# Patient Record
Sex: MALE | Race: OTHER | HISPANIC OR LATINO | URBAN - METROPOLITAN AREA
[De-identification: names, ages, dates, MRNs, and addresses within clinical notes are randomized per-mention and may not be internally consistent; named-entity substitution may affect disease eponyms.]

---

## 2020-02-10 ENCOUNTER — EMERGENCY (EMERGENCY)
Facility: HOSPITAL | Age: 50
LOS: 1 days | Discharge: HOME | End: 2020-02-10
Attending: EMERGENCY MEDICINE | Admitting: EMERGENCY MEDICINE
Payer: MEDICAID

## 2020-02-10 VITALS
RESPIRATION RATE: 16 BRPM | DIASTOLIC BLOOD PRESSURE: 80 MMHG | OXYGEN SATURATION: 99 % | TEMPERATURE: 98 F | SYSTOLIC BLOOD PRESSURE: 124 MMHG | HEART RATE: 62 BPM

## 2020-02-10 VITALS
TEMPERATURE: 98 F | HEART RATE: 93 BPM | RESPIRATION RATE: 16 BRPM | WEIGHT: 171.96 LBS | DIASTOLIC BLOOD PRESSURE: 68 MMHG | SYSTOLIC BLOOD PRESSURE: 145 MMHG | HEIGHT: 67 IN | OXYGEN SATURATION: 99 %

## 2020-02-10 LAB
ALBUMIN SERPL ELPH-MCNC: 4.3 G/DL — SIGNIFICANT CHANGE UP (ref 3.3–5)
ALP SERPL-CCNC: 83 U/L — SIGNIFICANT CHANGE UP (ref 40–120)
ALT FLD-CCNC: 32 U/L — SIGNIFICANT CHANGE UP (ref 12–78)
ANION GAP SERPL CALC-SCNC: 11 MMOL/L — SIGNIFICANT CHANGE UP (ref 5–17)
AST SERPL-CCNC: 25 U/L — SIGNIFICANT CHANGE UP (ref 15–37)
BASOPHILS # BLD AUTO: 0.06 K/UL — SIGNIFICANT CHANGE UP (ref 0–0.2)
BASOPHILS NFR BLD AUTO: 0.6 % — SIGNIFICANT CHANGE UP (ref 0–2)
BILIRUB SERPL-MCNC: 0.6 MG/DL — SIGNIFICANT CHANGE UP (ref 0.2–1.2)
BUN SERPL-MCNC: 15 MG/DL — SIGNIFICANT CHANGE UP (ref 7–23)
CALCIUM SERPL-MCNC: 8.9 MG/DL — SIGNIFICANT CHANGE UP (ref 8.5–10.1)
CHLORIDE SERPL-SCNC: 106 MMOL/L — SIGNIFICANT CHANGE UP (ref 96–108)
CO2 SERPL-SCNC: 24 MMOL/L — SIGNIFICANT CHANGE UP (ref 22–31)
CREAT SERPL-MCNC: 1 MG/DL — SIGNIFICANT CHANGE UP (ref 0.5–1.3)
EOSINOPHIL # BLD AUTO: 0.06 K/UL — SIGNIFICANT CHANGE UP (ref 0–0.5)
EOSINOPHIL NFR BLD AUTO: 0.6 % — SIGNIFICANT CHANGE UP (ref 0–6)
GLUCOSE SERPL-MCNC: 110 MG/DL — HIGH (ref 70–99)
HCT VFR BLD CALC: 42.4 % — SIGNIFICANT CHANGE UP (ref 39–50)
HGB BLD-MCNC: 14.5 G/DL — SIGNIFICANT CHANGE UP (ref 13–17)
IMM GRANULOCYTES NFR BLD AUTO: 0.2 % — SIGNIFICANT CHANGE UP (ref 0–1.5)
LYMPHOCYTES # BLD AUTO: 1.88 K/UL — SIGNIFICANT CHANGE UP (ref 1–3.3)
LYMPHOCYTES # BLD AUTO: 20.1 % — SIGNIFICANT CHANGE UP (ref 13–44)
MCHC RBC-ENTMCNC: 31.1 PG — SIGNIFICANT CHANGE UP (ref 27–34)
MCHC RBC-ENTMCNC: 34.2 GM/DL — SIGNIFICANT CHANGE UP (ref 32–36)
MCV RBC AUTO: 91 FL — SIGNIFICANT CHANGE UP (ref 80–100)
MONOCYTES # BLD AUTO: 0.88 K/UL — SIGNIFICANT CHANGE UP (ref 0–0.9)
MONOCYTES NFR BLD AUTO: 9.4 % — SIGNIFICANT CHANGE UP (ref 2–14)
NEUTROPHILS # BLD AUTO: 6.47 K/UL — SIGNIFICANT CHANGE UP (ref 1.8–7.4)
NEUTROPHILS NFR BLD AUTO: 69.1 % — SIGNIFICANT CHANGE UP (ref 43–77)
NRBC # BLD: 0 /100 WBCS — SIGNIFICANT CHANGE UP (ref 0–0)
PLATELET # BLD AUTO: 170 K/UL — SIGNIFICANT CHANGE UP (ref 150–400)
POTASSIUM SERPL-MCNC: 3.1 MMOL/L — LOW (ref 3.5–5.3)
POTASSIUM SERPL-SCNC: 3.1 MMOL/L — LOW (ref 3.5–5.3)
PROT SERPL-MCNC: 7.7 G/DL — SIGNIFICANT CHANGE UP (ref 6–8.3)
RBC # BLD: 4.66 M/UL — SIGNIFICANT CHANGE UP (ref 4.2–5.8)
RBC # FLD: 13.1 % — SIGNIFICANT CHANGE UP (ref 10.3–14.5)
SODIUM SERPL-SCNC: 141 MMOL/L — SIGNIFICANT CHANGE UP (ref 135–145)
TROPONIN I SERPL-MCNC: <.015 NG/ML — SIGNIFICANT CHANGE UP (ref 0.01–0.04)
WBC # BLD: 9.37 K/UL — SIGNIFICANT CHANGE UP (ref 3.8–10.5)
WBC # FLD AUTO: 9.37 K/UL — SIGNIFICANT CHANGE UP (ref 3.8–10.5)

## 2020-02-10 PROCEDURE — 93010 ELECTROCARDIOGRAM REPORT: CPT

## 2020-02-10 PROCEDURE — 80053 COMPREHEN METABOLIC PANEL: CPT

## 2020-02-10 PROCEDURE — 84484 ASSAY OF TROPONIN QUANT: CPT

## 2020-02-10 PROCEDURE — 71046 X-RAY EXAM CHEST 2 VIEWS: CPT

## 2020-02-10 PROCEDURE — 85027 COMPLETE CBC AUTOMATED: CPT

## 2020-02-10 PROCEDURE — 71046 X-RAY EXAM CHEST 2 VIEWS: CPT | Mod: 26

## 2020-02-10 PROCEDURE — 36415 COLL VENOUS BLD VENIPUNCTURE: CPT

## 2020-02-10 PROCEDURE — 99285 EMERGENCY DEPT VISIT HI MDM: CPT

## 2020-02-10 PROCEDURE — 93005 ELECTROCARDIOGRAM TRACING: CPT

## 2020-02-10 PROCEDURE — 99284 EMERGENCY DEPT VISIT MOD MDM: CPT | Mod: 25

## 2020-02-10 RX ORDER — ALPRAZOLAM 0.25 MG
0.25 TABLET ORAL ONCE
Refills: 0 | Status: DISCONTINUED | OUTPATIENT
Start: 2020-02-10 | End: 2020-02-10

## 2020-02-10 RX ORDER — BISOPROLOL FUMARATE 10 MG/1
1 TABLET, FILM COATED ORAL
Qty: 0 | Refills: 0 | DISCHARGE

## 2020-02-10 RX ORDER — POTASSIUM CHLORIDE 20 MEQ
40 PACKET (EA) ORAL ONCE
Refills: 0 | Status: COMPLETED | OUTPATIENT
Start: 2020-02-10 | End: 2020-02-10

## 2020-02-10 RX ORDER — ASPIRIN/CALCIUM CARB/MAGNESIUM 324 MG
1 TABLET ORAL
Qty: 0 | Refills: 0 | DISCHARGE

## 2020-02-10 RX ADMIN — Medication 0.25 MILLIGRAM(S): at 07:26

## 2020-02-10 RX ADMIN — Medication 40 MILLIEQUIVALENT(S): at 08:25

## 2020-02-10 NOTE — ED ADULT TRIAGE NOTE - CHIEF COMPLAINT QUOTE
c/o feeling heart racing * half hour- feels pressure on the left side of the ribs- has not been feeling well for the past couple of days, has cough, cold, congestion

## 2020-02-10 NOTE — ED ADULT NURSE NOTE - OBJECTIVE STATEMENT
pt states he had sinus pain and pressure for the past few days - today felt palpitations and panic- pt with hx of panic attacks - feels like his heart is racing

## 2020-02-10 NOTE — ED POST DISCHARGE NOTE - RESULT SUMMARY
#[id number] discussed xray chest result with patient over phone, understands to f/u with PMD for more outpatient imaging, will  report later this afternoon

## 2020-02-10 NOTE — ED PROVIDER NOTE - OBJECTIVE STATEMENT
translation by Manisha tech  49 yo male hx of htn c/o palpitations and anxiety since yesterday, nonradiating, no medications taken for this. recent URI symptoms and sinus pain taking mucinex.  No fever/chills.  No body aches.  No n/v/d.  No abdominal pain. No chest pain, no shortness of breath  Feels like his previous panic attack. Pt requesting anti anxiety medication

## 2020-02-10 NOTE — ED PROVIDER NOTE - PATIENT PORTAL LINK FT
You can access the FollowMyHealth Patient Portal offered by [facility] by registering at the following website: [email or web address]. By joining [employer]’s FollowMyHealth portal, you will also be able to view your health information using other applications (apps) compatible with our system.

## 2020-02-10 NOTE — ED ADULT NURSE NOTE - ED STAT RN HANDOFF DETAILS
Received the patient in the ER at the time of change of shift. Patient is alert and oriented. Skin warm and dry. Resting comfortably. Denies any chest pain. Pending further evaluation.

## 2020-02-10 NOTE — ED PROVIDER NOTE - NSFOLLOWUPINSTRUCTIONS_ED_ALL_ED_FT
1) Follow-up with your Primary Medical Doctor or referred doctor. Call today / next business day for prompt follow-up.  2) Return to Emergency room for any worsening or persistent pain, weakness, fever, or any other concerning symptoms.  3) See attached instruction sheets for additional information, including information regarding signs and symptoms to look out for, reasons to seek immediate care and other important instructions.  4) Take augmentin 875mg every 12 hours for 10 days.

## 2020-02-10 NOTE — ED PROVIDER NOTE - CARE PLAN
Principal Discharge DX:	Palpitations  Secondary Diagnosis:	Panic attack Principal Discharge DX:	Palpitations  Secondary Diagnosis:	Panic attack  Secondary Diagnosis:	Hypokalemia  Secondary Diagnosis:	Acute maxillary sinusitis, recurrence not specified

## 2020-02-10 NOTE — ED PROVIDER NOTE - PHYSICAL EXAMINATION
Gen: Alert, NAD  Head/eyes: NC/AT, PERRL  ENT: Bilateral TM WNL, normal hearing, patent oropharynx without erythema/exudate, uvula midline, no peritonsillar abscess, no tongue/uvula swelling, +ttp right maxillary sinus  Neck: supple, no tenderness/meningismus/JVD, Trachea midline  Pulm/lung: Bilateral clear BS, normal resp effort, no wheeze/stridor/retractions  CV/heart: RRR, no M/R/G, +2 dist pulses (radial, pedal DP/PT, popliteal)  GI/Abd: soft, NT/ND, +BS, no guarding/rebound tenderness  Musculoskeletal: no edema/erythema/cyanosis, FROM in all extremities, no C/T/L spine ttp  Skin: no rash, no vesicles, no petechaie, no ecchymosis, no swelling  Neuro: AAOx3, CN 2-12 intact, normal sensation, 5/5 motor strength in all extremities, normal gait, no dysmetria

## 2020-02-10 NOTE — ED PROVIDER NOTE - NS ED ROS FT

## 2020-06-27 PROBLEM — I10 ESSENTIAL (PRIMARY) HYPERTENSION: Chronic | Status: ACTIVE | Noted: 2020-02-10

## 2020-07-10 PROBLEM — Z00.00 ENCOUNTER FOR PREVENTIVE HEALTH EXAMINATION: Status: ACTIVE | Noted: 2020-07-10

## 2020-07-15 ENCOUNTER — APPOINTMENT (OUTPATIENT)
Dept: PULMONOLOGY | Facility: CLINIC | Age: 50
End: 2020-07-15

## 2020-09-01 ENCOUNTER — APPOINTMENT (OUTPATIENT)
Dept: PULMONOLOGY | Facility: CLINIC | Age: 50
End: 2020-09-01
Payer: MEDICAID

## 2020-09-01 VITALS
HEIGHT: 64 IN | DIASTOLIC BLOOD PRESSURE: 82 MMHG | BODY MASS INDEX: 22.88 KG/M2 | HEART RATE: 72 BPM | SYSTOLIC BLOOD PRESSURE: 138 MMHG | RESPIRATION RATE: 14 BRPM | OXYGEN SATURATION: 98 % | WEIGHT: 134 LBS

## 2020-09-01 DIAGNOSIS — Z78.9 OTHER SPECIFIED HEALTH STATUS: ICD-10-CM

## 2020-09-01 DIAGNOSIS — Z87.891 PERSONAL HISTORY OF NICOTINE DEPENDENCE: ICD-10-CM

## 2020-09-01 DIAGNOSIS — Z87.19 PERSONAL HISTORY OF OTHER DISEASES OF THE DIGESTIVE SYSTEM: ICD-10-CM

## 2020-09-01 DIAGNOSIS — Z86.39 PERSONAL HISTORY OF OTHER ENDOCRINE, NUTRITIONAL AND METABOLIC DISEASE: ICD-10-CM

## 2020-09-01 DIAGNOSIS — R09.89 OTHER SPECIFIED SYMPTOMS AND SIGNS INVOLVING THE CIRCULATORY AND RESPIRATORY SYSTEMS: ICD-10-CM

## 2020-09-01 DIAGNOSIS — Z86.79 PERSONAL HISTORY OF OTHER DISEASES OF THE CIRCULATORY SYSTEM: ICD-10-CM

## 2020-09-01 DIAGNOSIS — R91.1 SOLITARY PULMONARY NODULE: ICD-10-CM

## 2020-09-01 DIAGNOSIS — J98.11 ATELECTASIS: ICD-10-CM

## 2020-09-01 DIAGNOSIS — Z82.49 FAMILY HISTORY OF ISCHEMIC HEART DISEASE AND OTHER DISEASES OF THE CIRCULATORY SYSTEM: ICD-10-CM

## 2020-09-01 PROCEDURE — 99204 OFFICE O/P NEW MOD 45 MIN: CPT

## 2020-09-01 RX ORDER — MONTELUKAST 10 MG/1
10 TABLET, FILM COATED ORAL
Refills: 0 | Status: ACTIVE | COMMUNITY

## 2020-09-01 RX ORDER — BISOPROLOL FUMARATE AND HYDROCHLOROTHIAZIDE 2.5; 6.25 MG/1; MG/1
2.5-6.25 TABLET, FILM COATED ORAL
Refills: 0 | Status: ACTIVE | COMMUNITY

## 2020-09-01 RX ORDER — ASPIRIN 81 MG
81 TABLET, DELAYED RELEASE (ENTERIC COATED) ORAL
Refills: 0 | Status: ACTIVE | COMMUNITY

## 2020-09-01 RX ORDER — ATORVASTATIN CALCIUM 20 MG/1
20 TABLET, FILM COATED ORAL
Refills: 0 | Status: ACTIVE | COMMUNITY

## 2020-09-01 NOTE — DISCUSSION/SUMMARY
[FreeTextEntry1] : \par #1. Will schedule PFTs in near future to assess lung function with Covid testing prior to PFTs\par #2. The patient does not appear to require chronic BD therapy at this time\par #3. Diet and exercise for weight loss\par #4. SOBOE is likely related to weight or deconditioning\par #5. Repeat chest CT in 3/2021 for one year f/u and with PFTs given prior smoking hx\par #6. Quantiferon Gold for possible prior TB exposure\par #7. F/u in 3/2021 with chest CT and PFTs

## 2020-09-01 NOTE — PHYSICAL EXAM
[No Acute Distress] : no acute distress [Well Nourished] : well nourished [Well Developed] : well developed [Normal Appearance] : normal appearance [Supple] : supple [No Resp Distress] : no resp distress [No Acc Muscle Use] : no acc muscle use [Normal Rhythm and Effort] : normal rhythm and effort [Clear to Auscultation Bilaterally] : clear to auscultation bilaterally [No Abnormalities] : no abnormalities [Benign] : benign [Not Tender] : not tender [Soft] : soft [Normal Gait] : normal gait [No Clubbing] : no clubbing [No Cyanosis] : no cyanosis [No Edema] : no edema [No Focal Deficits] : no focal deficits [Oriented x3] : oriented x3

## 2020-09-01 NOTE — REVIEW OF SYSTEMS
[Sinus Problems] : sinus problems [Headache] : headache [Fever] : no fever [Chills] : no chills [Dry Eyes] : no dry eyes [Epistaxis] : no epistaxis [Eye Irritation] : no eye irritation [Nasal Congestion] : no nasal congestion [Postnasal Drip] : no postnasal drip [Cough] : no cough [Hemoptysis] : no hemoptysis [Chest Tightness] : no chest tightness [Frequent URIs] : no frequent URIs [Sputum] : no sputum [Dyspnea] : no dyspnea [Chest Discomfort] : no chest discomfort [Edema] : no edema [Palpitations] : no palpitations [Syncope] : no syncope [Hay Fever] : no hay fever [Itchy Eyes] : no itchy eyes [Seasonal Allergies] : no seasonal allergies [GERD] : no gerd [Abdominal Pain] : no abdominal pain [Nausea] : no nausea [Vomiting] : no vomiting [Diarrhea] : no diarrhea [Constipation] : no constipation [Back Pain] : no back pain [Anemia] : no anemia [Seizures] : no seizures [Dizziness] : no dizziness [Numbness] : no numbness [Paralysis] : no paralysis [Confusion] : no confusion [Depression] : no depression [Anxiety] : no anxiety [Diabetes] : no diabetes [Thyroid Problem] : no thyroid problem

## 2020-09-01 NOTE — HISTORY OF PRESENT ILLNESS
[Former] : former [Never] : never [TextBox_4] : Pt with an incidental finding of calcified granuloma on prior chest CT so presents for evaluation.\par He exercises regularly and has no respiratory complaints.\par He is an ex-smoker of 1/2 ppd x 10 years, quit in 2003.\par Pt reports a prior h/o bronchitis and pneumonia in 2018.\par Pt is from [facility] and denies any prior TB exposure. [TextBox_11] : .5 [TextBox_13] : 10 [YearQuit] : 2003

## 2020-09-01 NOTE — CONSULT LETTER
[Dear  ___] : Dear  [unfilled], [Consult Letter:] : I had the pleasure of evaluating your patient, [unfilled]. [Please see my note below.] : Please see my note below. [Consult Closing:] : Thank you very much for allowing me to participate in the care of this patient.  If you have any questions, please do not hesitate to contact me. [Sincerely,] : Sincerely, [FreeTextEntry3] : Tony Richter MD, FCCP, D. ABSM\par Pulmonary and Sleep Medicine\par [facility] [place]

## 2020-09-01 NOTE — REASON FOR VISIT
[Initial] : an initial visit [Abnormal CXR/ Chest CT] : an abnormal CXR/ chest CT [Pulmonary Nodules] : pulmonary nodules [[employer] ] : provided by [employer]   [FreeTextEntry1] : [id number] [FreeTextEntry2] : [place] [TWNoteComboBox1] : [place]

## 2021-04-26 ENCOUNTER — APPOINTMENT (OUTPATIENT)
Dept: PULMONOLOGY | Facility: CLINIC | Age: 51
End: 2021-04-26

## 2022-08-23 ENCOUNTER — APPOINTMENT (OUTPATIENT)
Dept: ORTHOPEDIC SURGERY | Facility: CLINIC | Age: 52
End: 2022-08-23

## 2022-08-23 VITALS
BODY MASS INDEX: 23.05 KG/M2 | WEIGHT: 135 LBS | SYSTOLIC BLOOD PRESSURE: 144 MMHG | HEIGHT: 64 IN | DIASTOLIC BLOOD PRESSURE: 80 MMHG | HEART RATE: 70 BPM

## 2022-08-23 PROCEDURE — 99204 OFFICE O/P NEW MOD 45 MIN: CPT

## 2022-08-23 PROCEDURE — 72040 X-RAY EXAM NECK SPINE 2-3 VW: CPT

## 2022-08-23 NOTE — HISTORY OF PRESENT ILLNESS
[de-identified] : 52-year-old male is complaining mainly of left focal shoulder pain onraising his shoulder. He also has right focal shoulder pain which has improved since he had physical therapy for the last 3 months since May 1, 2022once to twice a week specifically for neck and Right shoulder pain.  Shoulder pain began approximately one month ago after he was painting with a roller for significant amount of time several days in a row. He also has right-sided neck pain muscle spasm in nature, worseon palpation and with rotation to the left. He has had no physical therapy for his left shoulder pain. He has had no pain management intervention. Pain radiates at worst 8/10, he states it is incapacitating and he is unable to accomplish his activities of daily living when it is exacerbated. Neck pain radiates into his head causing headaches. YAZMIN questionnaire is negative he is not falling does not have decreased dexterity or sloppy penmanship.  He complains of polyarthralgias pain of multiple joints including the knees\par Past medical social allergy and family history was reviewed. Significant history includes a past diagnosis of fibromyalgia for which she was treated approximately 8 years ago by a rheumatologist but is now under no medical treatment for this.

## 2022-08-23 NOTE — PHYSICAL EXAM
[de-identified] : \par Cervical exam:\par CONSTITUTIONAL:  Patient is a very pleasant individual who is well-nourished and appears stated age. \par PSYCHIATRIC:  Alert and oriented times three and in no apparent distress, and participates with orthopedic evaluation well.\par HEAD:  Atraumatic and  nonsyndromic in appearance.\par EENT: No thyromegaly, EOMI.\par RESPIRATORY:  Respiratory rate is regular, not dyspneic on examination.\par LYMPHATICS:  There is no cervical or axillary lymphadenopathy.\par INTEGUMENTARY:  Skin is clean, dry, and intact about the bilateral upper extremities and cervical spine. \par VASCULAR:   There is brisk capillary refill about the bilateral upper extremities and radial pulses are 2/4. \par NEUROLOGIC:  Negative L'hirmitte, negative Spurling's sign. There are no pathologic reflexes. There is no decrease in sensation of the bilateral upper extremities on Wartenberg pinwheel/manual examination.  Deep tendon reflexes are well-maintained at +2/4 of the bilateral upper extremities and are symmetric.\par MUSCULOSKELETAL:  There is no visible muscular atrophy.  Manual motor strength is well maintained in the bilateral upper extremities.  Cervical range of motion is well maintained Pain on cervical extension and rotation to the left, reproducible myositis of the sternocleidomastoid deltoid trapezius on the right..  The patient ambulates in a non-myelopathic manner. Normal secondary orthopaedic exam of bilateral elbows and hands.  Elbow flexion and extension, wrist extension, finger flexion and abduction are well maintained.\par Positive left focal shoulder findings including left greater than right subacromial tenderness, pain on abduction and flexion of the left shoulder greater than the degrees, positive Fisher sign on the left.\par  [de-identified] : X-ray done today of the cervical spine shows decreased in intervertebral disc space at C5-C6 as well as decreased cervical lordosis. Otherwise there is no acute finding.

## 2022-08-23 NOTE — DISCUSSION/SUMMARY
[Medication Risks Reviewed] : Medication risks reviewed [de-identified] : Conservative treatment was discussed with the patient at length. Anticipatory guidance regarding disease process Fibromyalgia, polyarthralgias cervical spondylosis and myositis left shoulder bursitis, avoidance of acute exacerbation this was discussed at length and all patients commenting concerns were answered to the patient's satisfaction. Physical therapy for decrease pain and increase function was ordered. Patient was given home exercises as approved by [employer] and works well held directed toward this particular process. Intermittent use of acetaminophen 500 mg 2 tablets t.i.d. p.r.n. mild to moderate pain, ibuprofen 600 mg t.i.d. p.r.n. severe pain with food or milk if there is no medical contraindication. Medrol Dosepak for anti-inflammatory properties was described. Patient was informed that we did not give chronic prescriptions for office as we are surgical practice and did not have the activity to follow blood work and side effect profile etc.Home exercise including stretching on a daily basis for 20-30 minutes was recommended. Heat, ice, topical were discussed as needed. The patient will followup in 6-8 weeks at which point in time if symptoms continue we will order MRI studies to guide treatment plan including possible injection therapy with pain management versus surgical option.\par A focal shoulder pain continues consider a referral to upper extremities specialty for definitive management. Regarding fibromyalgiaandpolyarthralgias patient is referred to a rheumatologist.

## 2022-09-01 ENCOUNTER — APPOINTMENT (OUTPATIENT)
Dept: MRI IMAGING | Facility: CLINIC | Age: 52
End: 2022-09-01

## 2022-09-01 ENCOUNTER — OUTPATIENT (OUTPATIENT)
Dept: OUTPATIENT SERVICES | Facility: HOSPITAL | Age: 52
LOS: 1 days | End: 2022-09-01

## 2022-09-01 DIAGNOSIS — M47.812 SPONDYLOSIS WITHOUT MYELOPATHY OR RADICULOPATHY, CERVICAL REGION: ICD-10-CM

## 2022-09-01 PROCEDURE — 72141 MRI NECK SPINE W/O DYE: CPT | Mod: 26

## 2022-10-04 ENCOUNTER — APPOINTMENT (OUTPATIENT)
Dept: ORTHOPEDIC SURGERY | Facility: CLINIC | Age: 52
End: 2022-10-04

## 2022-10-04 VITALS
HEIGHT: 64 IN | DIASTOLIC BLOOD PRESSURE: 80 MMHG | SYSTOLIC BLOOD PRESSURE: 129 MMHG | BODY MASS INDEX: 23.05 KG/M2 | WEIGHT: 135 LBS | HEART RATE: 76 BPM

## 2022-10-04 PROCEDURE — 99213 OFFICE O/P EST LOW 20 MIN: CPT

## 2022-10-04 NOTE — PHYSICAL EXAM
[de-identified] : CONSTITUTIONAL: Patient is a very pleasant individual who is well-nourished and appears stated age. \par PSYCHIATRIC: Alert and oriented times three and in no apparent distress, and participates with orthopedic evaluation well.\par HEAD: Atraumatic and nonsyndromic in appearance.\par EENT: No thyromegaly, EOMI.\par RESPIRATORY: Respiratory rate is regular, not dyspneic on examination.\par LYMPHATICS: There is no cervical or axillary lymphadenopathy.\par INTEGUMENTARY: Skin is clean, dry, and intact about the bilateral upper extremities and cervical spine. \par VASCULAR: There is brisk capillary refill about the bilateral upper extremities and radial pulses are 2/4. \par NEUROLOGIC: Negative L'hirmitte, negative Spurling's sign. There are no pathologic reflexes. There is no decrease in sensation of the bilateral upper extremities on Wartenberg pinwheel/manual examination. Deep tendon reflexes are well-maintained at +2/4 of the bilateral upper extremities and are symmetric.\par MUSCULOSKELETAL: There is no visible muscular atrophy. Manual motor strength is well maintained in the bilateral upper extremities. Cervical range of motion is well maintained Pain on cervical extension and rotation to the left, reproducible myositis of the sternocleidomastoid deltoid trapezius on the right.. The patient ambulates in a non-myelopathic manner. Normal secondary orthopaedic exam of bilateral elbows and hands. Elbow flexion and extension, wrist extension, finger flexion and abduction are well maintained.\par Positive left focal shoulder findings including left greater than right subacromial tenderness, pain on abduction and flexion of the left shoulder greater than the degrees, positive Fisher sign on the left. [de-identified] : No new imaging\par \par X-ray done today of the cervical spine shows decreased in intervertebral disc space at C5-C6 as well as decreased cervical lordosis. Otherwise there is no acute finding.

## 2022-10-04 NOTE — DISCUSSION/SUMMARY
[Medication Risks Reviewed] : Medication risks reviewed [de-identified] : He is referred to the medicine and rehabilitation for facet injections particularly at C3-C4 on the right which may diminish his pain. He should be considered for trigger point injections as well. I am giving him Medrol Dosepak for anti-inflammatory properties after which he can restart diclofenac as prescribed by his primary care provider. Tylenol 1000 mg p.o. t.i.d. can also be helpful in decreasing pain.  Is no operative indication. He will followup p.r.n. continued pain despite one or 2 pain management injections.\par He is referred to rheumatology for evaluation for possible fibromyalgia or rheumatologic illness that can be treated with indication.

## 2022-10-04 NOTE — HISTORY OF PRESENT ILLNESS
[de-identified] : 52-year-old male continues to complain of right-sided upper neck pain radiating tohis head causing migraine headaches and radiating downward between his shoulder blades. Pain is severe at times,it has been going on approximately 2-1/2 months and has been constant. It's worse when he sits or stands in one position for a long period of time and somewhat better with rest. He is taking diclofenacand has been a role in physical therapy for 2 months or neck and upper extremity pain but hisneck pain continues despite treatment plan. YAZMIN questionnaire is negative. He does have back pain intermittently as well. He also complains of pain in multiple joints and states he has a history of fibromyalgia but has not been treated.

## 2022-10-11 ENCOUNTER — APPOINTMENT (OUTPATIENT)
Dept: ORTHOPEDIC SURGERY | Facility: CLINIC | Age: 52
End: 2022-10-11

## 2022-10-11 VITALS
HEIGHT: 64 IN | BODY MASS INDEX: 23.05 KG/M2 | HEART RATE: 62 BPM | WEIGHT: 135 LBS | DIASTOLIC BLOOD PRESSURE: 84 MMHG | SYSTOLIC BLOOD PRESSURE: 154 MMHG

## 2022-10-11 DIAGNOSIS — M75.52 BURSITIS OF LEFT SHOULDER: ICD-10-CM

## 2022-10-11 PROCEDURE — 99213 OFFICE O/P EST LOW 20 MIN: CPT

## 2022-10-11 NOTE — DISCUSSION/SUMMARY
[Medication Risks Reviewed] : Medication risks reviewed [de-identified] : Patient is again referred to physiatry, Dr. Joshua Atkinson for an injection C3-C4 on the right.  I think that will significantly help to decrease his pain level sufficiently.  He did well with a Medrol Dosepak.  He will continue home exercises as described by [employer].  Tylenol intermittently 500 mg 2 tablets every 8 hours as needed pain.  Heat, ice, topicals as needed.  Avoid excessive cervical extension.  Anticipatory guidance was given regarding disease process, facet stress reaction, how to avoid acute exacerbations and to treat exacerbations.  Follow-up as needed return of pain

## 2022-10-11 NOTE — PHYSICAL EXAM
[de-identified] : CONSTITUTIONAL:  Patient is a very pleasant individual who is well-nourished and appears stated age. \par PSYCHIATRIC:  Alert and oriented times three and in no apparent distress, and participates with orthopedic evaluation well.\par HEAD:  Atraumatic and  nonsyndromic in appearance.\par EENT: No thyromegaly, EOMI.\par RESPIRATORY:  Respiratory rate is regular, not dyspneic on examination.\par LYMPHATICS:  There is no cervical or axillary lymphadenopathy.\par INTEGUMENTARY:  Skin is clean, dry, and intact about the bilateral upper extremities and cervical spine. \par VASCULAR:   There is brisk capillary refill about the bilateral upper extremities and radial pulses are 2/4. \par NEUROLOGIC:  Negative L'hirmitte, negative Spurling's sign. There are no pathologic reflexes. There is no decrease in sensation of the bilateral upper extremities on Wartenberg pinwheel/manual examination.  Deep tendon reflexes are well-maintained at +2/4 of the bilateral upper extremities and are symmetric.\par MUSCULOSKELETAL:  There is no visible muscular atrophy.  Manual motor strength is well maintained in the bilateral upper extremities.  Cervical range of motion is well maintained.  The patient ambulates in a non-myelopathic manner. Normal secondary orthopaedic exam of bilateral shoulders, elbows and hands.  Elbow flexion and extension, wrist extension, finger flexion and abduction are well maintained.\par Exam is highlighted by mechanical neck pain on extension and palpation of the upper right cervical paraspinals. [de-identified] : No new x-rays are reviewed\par \par X-ray done today of the cervical spine shows decreased in intervertebral disc space at C5-C6 as well as decreased cervical lordosis. Otherwise there is no acute finding. \par Mri cervical spine [facility] demonstrates a stress reaction at C3-C4 on the right.  Otherwise cervical MRI is unremarkable.  There is no cord signal change.

## 2022-10-11 NOTE — HISTORY OF PRESENT ILLNESS
[de-identified] : 52-year-old male is here for follow-up of right-sided neck pain.  Pain began after he was painting using an extended neck position approximately 2 and half months ago.  He was given a Medrol Dosepak 10 days ago and states that his pain is significantly decreased.  He would like his pain management injections to be done in the near future.  His pain is based at the upper right side of his neck but it does intermittently radiate downward and upward.  He states for the last 10 days his pain has been rating approximately 4 out of 10, sore in nature.  YAZMIN questionnaire is negative

## 2022-11-02 ENCOUNTER — APPOINTMENT (OUTPATIENT)
Dept: PHYSICAL MEDICINE AND REHAB | Facility: CLINIC | Age: 52
End: 2022-11-02

## 2022-11-02 VITALS
HEIGHT: 64 IN | RESPIRATION RATE: 14 BRPM | BODY MASS INDEX: 27.31 KG/M2 | HEART RATE: 64 BPM | DIASTOLIC BLOOD PRESSURE: 84 MMHG | SYSTOLIC BLOOD PRESSURE: 120 MMHG | WEIGHT: 160 LBS

## 2022-11-02 DIAGNOSIS — Z78.9 OTHER SPECIFIED HEALTH STATUS: ICD-10-CM

## 2022-11-02 PROCEDURE — 99204 OFFICE O/P NEW MOD 45 MIN: CPT

## 2022-11-02 RX ORDER — METHYLPREDNISOLONE 4 MG/1
4 TABLET ORAL
Qty: 1 | Refills: 1 | Status: COMPLETED | COMMUNITY
Start: 2022-10-04 | End: 2022-11-02

## 2022-11-02 RX ORDER — METHYLPREDNISOLONE 4 MG/1
4 TABLET ORAL
Qty: 1 | Refills: 0 | Status: COMPLETED | COMMUNITY
Start: 2022-10-04 | End: 2022-11-02

## 2022-11-02 RX ORDER — METHYLPREDNISOLONE 4 MG/1
4 TABLET ORAL
Qty: 1 | Refills: 0 | Status: COMPLETED | COMMUNITY
Start: 2022-08-23 | End: 2022-11-02

## 2022-11-02 NOTE — DATA REVIEWED
[FreeTextEntry1] : MR C Spine 9/1/22 reviewed by me: marked spondylosis at the R C3-4 facet joint, no areas of severe canal stenosis\par \par   MR Cervical Spine No Cont             Final\par \par No Documents Attached\par \par \par \par \par   EXAM: [id number] - MR SPINE CERVICAL  - ORDERED BY: RAUL FORD\par \par \par PROCEDURE DATE:  09/01/2022\par \par \par \par INTERPRETATION:  CERVICAL SPINE MRI\par \par CLINICAL INFORMATION: Neck pain. Cervical spondylosis.\par TECHNIQUE: Multiplanar, multisequence MRI was obtained of the cervical spine.\par \par FINDINGS:\par \par DISC LEVEL EVALUATION: Minimal multilevel upper cervical uncovertebral spurring without significant central canal or foraminal narrowing.\par FACET JOINTS: Moderate right-sided facet arthrosis at C3-C4 with marked associated bone marrow edema\par ALIGNMENT: Normal\par CORD: There is no cord signal abnormality.\par MARROW: Marked bone marrow edema within the bones of the right-sided facet joint at C3-C4.\par IMAGED BRAIN: Normal\par PERIPHERAL/NECK SOFT TISSUES: Normal\par \par IMPRESSION: Moderate right facet arthrosis at C3-C4 with marked associated bone marrow edema. This finding frequently correlates with facet generated neck pain.\par \par --- End of Report ---\par \par \par \par \par \par \par MARIAN NEWBY MD; Attending Radiologist\par This document has been electronically signed. Sep 13 2022  8:36AM\par \par  \par \par  Ordered by: RAUL FORD       Collected/Examined: 01Sep2022 11:20AM       \par Verified by: RAUL FORD 13Sep2022 12:49PM       \par  Result Communication: No patient communication needed at this time;\par Stage: Final       \par  Performed at: [facility]       Resulted: 12Sep2022 10:57AM       Last Updated: 13Sep2022 12:49PM       Accession: [id number]       
no

## 2022-11-02 NOTE — PROCEDURE
[de-identified] : Reason for procedure: Myalgia\par \par Procedure: Trigger Point Injections\par Physician: Dr. Atkinson\par Medication injected: Lidocaine 1%, 3cc total\par Sedation medications: None\par Estimated blood loss: None\par Complications: None\par \par Technique: R/B/A to trigger point injection reviewed with patient. The patient is agreeable and wishes to proceed.   The patient was placed in the seated position and trigger points of the right cervical paraspinals were identified. The area was prepped in normal sterile fashion with Chloroprep. A 27 gauge 1.25 inch needle was advanced into the palpable trigger points with reproduction of pain. After negative aspiration of heme, the above medications were injected into the trigger areas. Needle was then removed, bandaid placed over injection sites. There was no complications, the patient was provided with post injection instructions.

## 2022-11-02 NOTE — PHYSICAL EXAM
[FreeTextEntry1] : PE:\par Constitutional: In NAD, calm and cooperative\par MSK (Neck):\par 	Inspection: no gross swelling identified\par 	Palpation: Tenderness of the right lower cervical paraspinals\par 	ROM: Pain at end cervical extension > flexion and end lateral rotation to left\par 	Strength: 5/5 strength in bilateral upper extremities\par 	Reflexes: 2+ Biceps/Brachioradialis/Triceps/patella/Achilles reflex bilaterally, Benites’s/Clonus negative bilaterally\par 	Sensation: Intact to light touch in bilateral upper extremities\par 	Special tests: Spurling’s test negative bilaterally

## 2022-11-02 NOTE — ASSESSMENT
[FreeTextEntry1] : Mr. KIMO ELLIS is a 52 year old male who presents with R sided neck pain with C3-4 Facet joint arthrosis seen on MRI that correlates with his pain. Pain likely also has a myofascial component. Denies any red flag signs. Will recommend:\par - MRI C Spine reviewed. Ortho Spine notes reviewed - no surgery recommended at this time. \par - Patient says his pain has improved since his visit with Ortho Spine. Discussed with patient the risks (including but not limited to bleeding, infection, nerve damage, etc), benefits and alternatives to a a medial branch block injection for which patient understands. He would like to hold off on procedure for now\par - TPI performed today, tolerated well\par - Patient to try to avoid NSAIDs given history of HTN. He will continue Tylenol PRN, rarely takes it now. \par \par RTC in 2 weeks. Patient aware of red flag signs including any changes to their bowel/bladder control, groin numbness or new weakness. Patient knows to seek immediate attention by calling 911 or going to nearest ER if these symptoms appear.

## 2022-11-02 NOTE — HISTORY OF PRESENT ILLNESS
[FreeTextEntry1] : Mr. KIMO ELLIS  is a 52 year old male with a PMHx of Fibromyalgia who presents with neck pain. He presents as a referral by Dr. Lamar for possible facet joint injection/trigger point injections. \par \par Location:right side of neck\par Onset:Started around 6/2022, no inciting event \par Provocation/Palliative: Worse with movement, better with rest\par Quality:Sharp \par Radiation:To R shoulder, nothing down arm\par Severity:1-2/10, 10/10\par Timing:Improving with time\par \par Denies any associated numbness. Denies any associated arm or hand weakness. Denies any loss of bowel/bladder control or any groin numbness.\par Previous medications trialed:Medrol with significant relief, occasional Tylenol now\par Previous procedures relevant to complaint:None\par Has tried conservative treatment?:Has tried PT\par  \par Patient preferred his  Berto johanne traore

## 2022-11-18 ENCOUNTER — APPOINTMENT (OUTPATIENT)
Dept: PHYSICAL MEDICINE AND REHAB | Facility: CLINIC | Age: 52
End: 2022-11-18

## 2022-11-18 VITALS
HEART RATE: 68 BPM | RESPIRATION RATE: 14 BRPM | WEIGHT: 175 LBS | SYSTOLIC BLOOD PRESSURE: 120 MMHG | BODY MASS INDEX: 29.88 KG/M2 | HEIGHT: 64 IN | DIASTOLIC BLOOD PRESSURE: 72 MMHG

## 2022-11-18 PROCEDURE — 99213 OFFICE O/P EST LOW 20 MIN: CPT | Mod: 25

## 2022-11-18 PROCEDURE — 20552 NJX 1/MLT TRIGGER POINT 1/2: CPT

## 2022-11-18 NOTE — DATA REVIEWED
[FreeTextEntry1] : Reason for procedure: Myalgia\par \par Procedure: Trigger Point Injections\par Physician: Dr. Atkinson\par Medication injected: Lidocaine 1%, 3cc and Kenalog 40mg/ml \par Sedation medications: None\par Estimated blood loss: None\par Complications: None\par \par Technique: R/B/A to trigger point injection reviewed with patient. The patient is agreeable and wishes to proceed.   The patient was placed in the seated position and trigger points of right cervical paraspinals were identified. The area was prepped in normal sterile fashion with Chloroprep. A 27 gauge 1.25 inch needle was advanced into the palpable trigger points with reproduction of pain. After negative aspiration of heme, the above medications were injected into the trigger areas. Needle was then removed, bandaid placed over injection sites. There was no complications, the patient was provided with post injection instructions.

## 2022-11-18 NOTE — PHYSICAL EXAM
[FreeTextEntry1] : PE:\par Constitutional: In NAD, calm and cooperative\par MSK (Neck):\par 	Inspection: no gross swelling identified, no erythema or swelling\par 	Palpation: Tenderness of the right lower cervical paraspinals\par 	ROM: Pain at end cervical extension > flexion and end lateral rotation to left\par 	Strength: 5/5 strength in bilateral upper extremities\par 	Reflexes: 2+ Biceps/Brachioradialis/Triceps/patella/Achilles reflex bilaterally, Benites’s/Clonus negative bilaterally\par 	Sensation: Intact to light touch in bilateral upper extremities\par 	Special tests: Spurling’s test negative bilaterally

## 2022-11-18 NOTE — ASSESSMENT
[FreeTextEntry1] : Mr. KIMO ELLIS is a 52 year old male who presents with R sided neck pain with C3-4 Facet joint arthrosis seen on MRI that correlates with his pain. Pain likely also has a myofascial component. Denies any red flag signs. Will recommend:\par - Again discussed with patient the risks (including but not limited to bleeding, infection, nerve damage, etc), benefits and alternatives to a a medial branch block injection for which patient understands. He would like to hold off on procedure for now\par - TPI performed today, tolerated well\par - Patient to try to avoid NSAIDs given history of HTN. He will continue Tylenol PRN, rarely takes it now. \par \par RTC in 2-3 weeks. Patient aware of red flag signs including any changes to their bowel/bladder control, groin numbness or new weakness. Patient knows to seek immediate attention by calling 911 or going to nearest ER if these symptoms appear.

## 2022-11-18 NOTE — HISTORY OF PRESENT ILLNESS
[FreeTextEntry1] : Mr. KIMO ELLIS is a 52 year old  male who presents for follow up. At last visit, he was given TPI, and continued on Tylenol. He did get some relief from the injection. Denies any new symptoms. \par \par Location:right side of neck\par Onset:Started around 6/2022, no inciting event \par Provocation/Palliative: Worse with movement, better with rest\par Quality:Sharp \par Radiation:To R shoulder, nothing down arm\par Severity:1-2/10 today\par Timing:Improving with time\par \par No bowel/bladder changes. No groin numbness. \par \par Patient prefers his  to translate

## 2022-12-14 ENCOUNTER — APPOINTMENT (OUTPATIENT)
Dept: RHEUMATOLOGY | Facility: CLINIC | Age: 52
End: 2022-12-14

## 2022-12-16 ENCOUNTER — APPOINTMENT (OUTPATIENT)
Dept: PHYSICAL MEDICINE AND REHAB | Facility: CLINIC | Age: 52
End: 2022-12-16

## 2022-12-16 VITALS
WEIGHT: 165 LBS | BODY MASS INDEX: 28.17 KG/M2 | RESPIRATION RATE: 14 BRPM | HEIGHT: 64 IN | DIASTOLIC BLOOD PRESSURE: 90 MMHG | HEART RATE: 70 BPM | SYSTOLIC BLOOD PRESSURE: 148 MMHG

## 2022-12-16 PROCEDURE — 99214 OFFICE O/P EST MOD 30 MIN: CPT | Mod: 25

## 2022-12-16 PROCEDURE — 20553 NJX 1/MLT TRIGGER POINTS 3/>: CPT

## 2022-12-16 NOTE — HISTORY OF PRESENT ILLNESS
[FreeTextEntry1] : Mr. KIMO ELLIS is a 52 year old  male who presents for follow up. At last visit, he was given a TPI and continued on Tylenol PRN. He got significant pain relief from the TPI but only for 2 weeks. Denies any new symptoms. \par \par Location:right side of neck, upper back area. \par Onset:Started around 6/2022, no inciting event \par Provocation/Palliative: Worse with movement, better with rest\par Quality:Sharp \par Radiation:To R shoulder, nothing down arm\par Severity:5/10 today\par Timing: not improving significantly with time\par \par No bowel/bladder changes. No groin numbness. \par \par Patient prefers his  his translate

## 2022-12-16 NOTE — PROCEDURE
[de-identified] : Reason for procedure: Myalgia\par \par Procedure: Trigger Point Injections\par Physician: Dr. Atkinson\par Medication injected: Lidocaine 1%, 4cc total\par Sedation medications: None\par Estimated blood loss: None\par Complications: None\par \par Technique: R/B/A to trigger point injection reviewed with patient. The patient is agreeable and wishes to proceed.   The patient was placed in the seated position and trigger points of the right trapezius, levator scapulae and cervical paraspinals were identified. The area was prepped in normal sterile fashion with Chloroprep. A 27 gauge 1.25 inch needle was advanced into the palpable trigger points with reproduction of pain. After negative aspiration of heme, the above medications were injected into the trigger areas. Needle was then removed, bandaid placed over injection sites. There was no complications, the patient was provided with post injection instructions.

## 2022-12-16 NOTE — ASSESSMENT
[FreeTextEntry1] : Mr. KIMO ELLIS is a 52 year old male who presents with R sided neck pain with C3-4 Facet joint arthrosis seen on MRI that correlates with his pain. Pain likely also has a myofascial component. Denies any red flag signs. Will recommend:\par - Again discussed with patient the risks (including but not limited to bleeding, infection, nerve damage, etc), benefits and alternatives to a a medial branch block injection for which patient understands. Will plan for a R C3, C4, C5 MBB. He will need a COVID PCR test 3 days prior. \par - TPI performed today, tolerated well\par - Patient to try to avoid NSAIDs given history of HTN. He will continue Tylenol PRN, rarely takes it now. \par \par Return for procedure. Patient aware of red flag signs including any changes to their bowel/bladder control, groin numbness or new weakness. Patient knows to seek immediate attention by calling 911 or going to nearest ER if these symptoms appear.

## 2023-01-09 ENCOUNTER — APPOINTMENT (OUTPATIENT)
Dept: RHEUMATOLOGY | Facility: CLINIC | Age: 53
End: 2023-01-09

## 2023-01-25 LAB — SARS-COV-2 N GENE NPH QL NAA+PROBE: NOT DETECTED

## 2023-01-27 ENCOUNTER — APPOINTMENT (OUTPATIENT)
Dept: PHYSICAL MEDICINE AND REHAB | Facility: GI CENTER | Age: 53
End: 2023-01-27

## 2023-02-08 ENCOUNTER — APPOINTMENT (OUTPATIENT)
Dept: PHYSICAL MEDICINE AND REHAB | Facility: CLINIC | Age: 53
End: 2023-02-08
Payer: MEDICAID

## 2023-02-08 VITALS
HEART RATE: 81 BPM | WEIGHT: 165 LBS | BODY MASS INDEX: 25.9 KG/M2 | SYSTOLIC BLOOD PRESSURE: 130 MMHG | DIASTOLIC BLOOD PRESSURE: 77 MMHG | HEIGHT: 67 IN | RESPIRATION RATE: 14 BRPM

## 2023-02-08 PROCEDURE — 99213 OFFICE O/P EST LOW 20 MIN: CPT

## 2023-02-08 NOTE — HISTORY OF PRESENT ILLNESS
[FreeTextEntry1] : Mr. KIMO ELLIS is a 53 year old  male who presents for follow up. At last visit, he was gien TPI and ordered a cervical MBB. The MBB was not approved by insurance. Still complaining of same type of pain. No new associated symptoms. \par \par Location:right side of neck, upper back area. \par Onset:Started around 6/2022, no inciting event \par Provocation/Palliative: Worse with movement, better with rest\par Quality:Sharp \par Radiation:To R shoulder, nothing down arm\par Severity:5/10 today\par Timing: not improving significantly with time\par \par \par No bowel/bladder changes. No groin numbness. \par \par Patient preferred  to translate

## 2023-02-08 NOTE — ASSESSMENT
[FreeTextEntry1] : Mr. KIMO ELLIS is a 52 year old male who presents with R sided neck pain with C3-4 Facet joint arthrosis seen on MRI that correlates with his pain. Pain likely also has a myofascial component. Denies any red flag signs. Will recommend:\par - Again discussed with patient the risks (including but not limited to bleeding, infection, nerve damage, etc), benefits and alternatives to a a medial branch block injection for which patient understands. Given insurance limitations, will refer patient to outside physician for consideration of procedure. \par - Given his persistent pain, and better controlled BP, will give short course of Diclofenac 75mg BID x 1 week, and then PRN thereafter. Patient advised on cardiac/gi/renal side effects. Patient encouraged to take medication with food and not with other NSAIDs. \par \par Return as needed. Patient and  in agreement with plan. Patient aware of red flag signs including any changes to their bowel/bladder control, groin numbness or new weakness. Patient knows to seek immediate attention by calling 911 or going to nearest ER if these symptoms appear.

## 2023-02-13 ENCOUNTER — APPOINTMENT (OUTPATIENT)
Dept: RHEUMATOLOGY | Facility: CLINIC | Age: 53
End: 2023-02-13
Payer: MEDICAID

## 2023-02-13 VITALS
OXYGEN SATURATION: 98 % | DIASTOLIC BLOOD PRESSURE: 80 MMHG | BODY MASS INDEX: 29.23 KG/M2 | HEIGHT: 63 IN | SYSTOLIC BLOOD PRESSURE: 130 MMHG | TEMPERATURE: 97.9 F | HEART RATE: 66 BPM | RESPIRATION RATE: 17 BRPM | WEIGHT: 165 LBS

## 2023-02-13 PROCEDURE — 99204 OFFICE O/P NEW MOD 45 MIN: CPT

## 2023-02-13 NOTE — PHYSICAL EXAM
[General Appearance - Well Nourished] : well nourished [General Appearance - Well Developed] : well developed [Sclera] : the sclera and conjunctiva were normal [Hearing Threshold Finger Rub Not Kerr] : hearing was normal [Nail Clubbing] : no clubbing  or cyanosis of the fingernails [Musculoskeletal - Swelling] : no joint swelling seen [Motor Tone] : muscle strength and tone were normal [FreeTextEntry1] : FROM throughout and no evidence of synovitis, Cervical ROM overall normal with some pain on right ear to shoulder, no paraspinal spasms  [] : no rash [Skin Lesions] : no skin lesions [Affect] : the affect was normal [Mood] : the mood was normal

## 2023-02-13 NOTE — ASSESSMENT
[FreeTextEntry1] : 54 yo man with moderate Right facet arthrosis at c3-c4.  s/p medrol pack , multiple injections by pain management and now awaiting possible nerve ablation.  he signs and symptoms are not concerning for inflammatory back pain.  there is no concern for systemic inflammatory arthritis \par \par --reassured patient that there is no inflammatory arthritis contributing to his cervical pain \par --he is follow up with pain management.   if ablation still not giving him the relief he needs than return to ortho \par --todays exam also not suggestive of fibromyalgia \par --he can return to our clinic as needed

## 2023-02-13 NOTE — HISTORY OF PRESENT ILLNESS
[FreeTextEntry1] : 52 yo man with history of HTN, migraines, IBS, palpitations, fibromyalgia presents for evaluation of joint pain.  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  He started PT for his shoulders and noted significant improvement.  Per patient report had shoulder xrays which was normal \par \par He has developed cervical pain since MAy 2022.  states that his neck pain is relief by rest.  If he works or exercises the neck pain worsens.  He was seen by spine ortho and was given medrol pack with some temporary pain relief.  he is followed by pain management and had 3 injection with mild response.  More recent was referred for a possible nerve ablation.  Cervical pain relief temporary with diclofenac   \par \par Patient denies any morning stiffness or joint swelling.  No lower back pain \par \par Patient denies any history of STDs, red painful eye/photophobia, oral lesions, IBD s/s, psoriasis or FH of psoriasis, urinary difficulty or incontinence, other rashes, sob/cough/CP, serositis, low counts.\par \par PMH: as above\par surgery: appendectomy with peritonitis, sinus surgery \par allergy: iodine /contrast \par meds: bisoprolol, ASA, descovy ( HIV PPX) \par FH: GM with RA and possibly mother with RA ?\par SH: lives with pattern , he is a manager at a restaurant, no smoking or illicit drugs.  rare alcohol   \par

## 2023-03-03 ENCOUNTER — APPOINTMENT (OUTPATIENT)
Dept: ORTHOPEDIC SURGERY | Facility: CLINIC | Age: 53
End: 2023-03-03
Payer: MEDICAID

## 2023-03-03 VITALS
DIASTOLIC BLOOD PRESSURE: 76 MMHG | HEIGHT: 63 IN | BODY MASS INDEX: 29.23 KG/M2 | HEART RATE: 84 BPM | WEIGHT: 165 LBS | SYSTOLIC BLOOD PRESSURE: 143 MMHG

## 2023-03-03 DIAGNOSIS — M47.812 SPONDYLOSIS W/OUT MYELOPATHY OR RADICULOPATHY, CERVICAL REGION: ICD-10-CM

## 2023-03-03 DIAGNOSIS — M79.18 MYALGIA, OTHER SITE: ICD-10-CM

## 2023-03-03 DIAGNOSIS — M79.7 FIBROMYALGIA: ICD-10-CM

## 2023-03-03 DIAGNOSIS — M79.10 MYALGIA, UNSPECIFIED SITE: ICD-10-CM

## 2023-03-03 DIAGNOSIS — M25.50 PAIN IN UNSPECIFIED JOINT: ICD-10-CM

## 2023-03-03 PROCEDURE — 99214 OFFICE O/P EST MOD 30 MIN: CPT

## 2023-03-03 PROCEDURE — 72040 X-RAY EXAM NECK SPINE 2-3 VW: CPT

## 2023-03-03 RX ORDER — DICLOFENAC SODIUM 75 MG/1
75 TABLET, DELAYED RELEASE ORAL
Qty: 30 | Refills: 1 | Status: ACTIVE | COMMUNITY
Start: 2023-03-03 | End: 1900-01-01

## 2023-03-03 NOTE — PHYSICAL EXAM
[de-identified] : CONSTITUTIONAL: Patient is a very pleasant individual who is well-nourished and appears stated age. \par PSYCHIATRIC: Alert and oriented times three and in no apparent distress, and participates with orthopedic evaluation well.\par HEAD: Atraumatic and nonsyndromic in appearance.\par EENT: No thyromegaly, EOMI.\par RESPIRATORY: Respiratory rate is regular, not dyspneic on examination.\par LYMPHATICS: There is no cervical or axillary lymphadenopathy.\par INTEGUMENTARY: Skin is clean, dry, and intact about the bilateral upper extremities and cervical spine. \par VASCULAR: There is brisk capillary refill about the bilateral upper extremities and radial pulses are 2/4. \par NEUROLOGIC: Negative L'hirmitte, negative Spurling's sign. There are no pathologic reflexes. There is no decrease in sensation of the bilateral upper extremities on Wartenberg pinwheel/manual examination. Deep tendon reflexes are well-maintained at +2/4 of the bilateral upper extremities and are symmetric.\par MUSCULOSKELETAL: There is no visible muscular atrophy. Manual motor strength is well maintained in the bilateral upper extremities. Cervical range of motion is well maintained. The patient ambulates in a non-myelopathic manner. Normal secondary orthopaedic exam of bilateral shoulders, elbows and hands. Elbow flexion and extension, wrist extension, finger flexion and abduction are well maintained.\par Exam is highlighted by mechanical neck pain on extension and palpation of the upper right cervical paraspinals. \par  [de-identified] : X-ray done March 3, 2023 in the office of the cervical spine shows decreased in intervertebral disc space at C5-C6 as well as decreased cervical lordosis. Otherwise there is no acute finding. \par September 2022 Mri cervical spine [facility] demonstrates a stress reaction at C3-C4 on the right. Otherwise cervical MRI is unremarkable. There is no cord signal change. \par \par

## 2023-03-03 NOTE — HISTORY OF PRESENT ILLNESS
[de-identified] : 53-year-old male is here for follow-up of right-sided neck pain. Pain began after he was painting using an extended neck position approximately 7 months ago. He was given a Medrol Dosepak and diclofenac in the past both of which have relieved his pain.  He would like a refill of diclofenac.  He states he went to rheumatology and that they did not refill his medication.. He had some pain management injections and states he is pending a pain management injection in the near future.  These of also worked to relieve his pain. His pain is based at the upper right side of his neck but it does intermittently radiate downward and upward. He states for the last 7 days his pain has been rating approximately 4 out of 10, sore in nature. YAZMIN questionnaire is negative

## 2023-03-03 NOTE — DISCUSSION/SUMMARY
[Medication Risks Reviewed] : Medication risks reviewed [PRN] : PRN [de-identified] : 20 minutes was spent reviewing the x-rays as well as discussing with the patient their clinical presentation, diagnosis and providing education.  Conservative treatment was discussed with the patient at length. Anticipatory guidance regarding disease process focal spondylosis namely cervical stress reaction at C3-C4 on the right, polyarthralgias, fibromyalgia, avoidance of acute exacerbation this was discussed at length and all patients commenting concerns were answered to the patient's satisfaction. Physical therapy for decrease pain and increase function was discussed but patient states he is doing home exercises instead, he has been through physical therapy and does the home exercises daily. Patient was given home exercises as approved by [employer] and works well held directed toward this particular process. Intermittent use of acetaminophen 500 mg 2 tablets t.i.d. p.r.n. mild to moderate pain, diclofenac 75 mg p.o. twice daily with food or milk 1 month prescription was granted and he will have prescription continued by primary care provider or rheumatology.  I did explain that this is a surgical practice and we do not prescribe chronic medications because we do not have blood work to monitor etc.  Home exercise including stretching on a daily basis for 20-30 minutes was recommended. Heat, ice, topical were discussed as needed.  Continue follow-up with pain management, I think facet injections will be particularly helpful in decreasing his pain.  Is no acute spine surgical indication.  The patient will followup as needed surgical option is necessary.

## 2023-03-17 ENCOUNTER — APPOINTMENT (OUTPATIENT)
Dept: RHEUMATOLOGY | Facility: CLINIC | Age: 53
End: 2023-03-17

## 2023-10-11 NOTE — ED ADULT NURSE NOTE - RESPIRATORY ASSESSMENT
WDL Localized Dermabrasion Text: The patient was draped in routine manner.  Localized dermabrasion using sterilized sandpaper was performed in routine manner to papillary dermis. This spot dermabrasion is being performed to complete skin cancer reconstruction. It also will eliminate the other sun damaged precancerous cells that are known to be part of the regional effect of a lifetime's worth of sun exposure. This localized dermabrasion is therapeutic and should not be considered cosmetic in any regard. Localized Dermabrasion With Wire Brush Text: The patient was draped in routine manner.  Localized dermabrasion using sterilized sandpaper was performed in routine manner to papillary dermis. This spot dermabrasion is being performed to complete skin cancer reconstruction. It also will eliminate the other sun damaged precancerous cells that are known to be part of the regional effect of a lifetime's worth of sun exposure. This localized dermabrasion is therapeutic and should not be considered cosmetic in any regard.